# Patient Record
Sex: MALE | Race: BLACK OR AFRICAN AMERICAN | NOT HISPANIC OR LATINO | Employment: OTHER | URBAN - METROPOLITAN AREA
[De-identification: names, ages, dates, MRNs, and addresses within clinical notes are randomized per-mention and may not be internally consistent; named-entity substitution may affect disease eponyms.]

---

## 2020-06-01 PROBLEM — D50.9 IRON DEFICIENCY ANEMIA: Status: ACTIVE | Noted: 2020-06-01

## 2020-06-01 PROBLEM — I10 ESSENTIAL HYPERTENSION: Status: ACTIVE | Noted: 2020-06-01

## 2020-06-01 PROBLEM — D63.1 ANEMIA DUE TO CHRONIC KIDNEY DISEASE, ON CHRONIC DIALYSIS: Status: ACTIVE | Noted: 2020-06-01

## 2020-06-01 PROBLEM — R29.898 WEAKNESS OF EXTREMITY: Status: ACTIVE | Noted: 2020-06-01

## 2020-06-01 PROBLEM — M10.9 GOUT: Status: ACTIVE | Noted: 2020-06-01

## 2020-06-01 PROBLEM — M54.2 NECK PAIN: Status: ACTIVE | Noted: 2020-06-01

## 2020-06-01 PROBLEM — Z99.2 ANEMIA DUE TO CHRONIC KIDNEY DISEASE, ON CHRONIC DIALYSIS: Status: ACTIVE | Noted: 2020-06-01

## 2020-06-01 PROBLEM — I25.810 CORONARY ARTERY DISEASE INVOLVING CORONARY BYPASS GRAFT WITHOUT ANGINA PECTORIS: Status: ACTIVE | Noted: 2020-06-01

## 2020-06-01 PROBLEM — N18.6 ANEMIA DUE TO CHRONIC KIDNEY DISEASE, ON CHRONIC DIALYSIS: Status: ACTIVE | Noted: 2020-06-01

## 2020-06-01 PROBLEM — Z72.0 TOBACCO USE: Status: ACTIVE | Noted: 2020-06-01

## 2020-06-01 PROBLEM — B18.2 HEPATITIS C CARRIER: Status: ACTIVE | Noted: 2020-06-01

## 2020-06-01 PROBLEM — N18.6 ESRD (END STAGE RENAL DISEASE): Status: ACTIVE | Noted: 2018-11-30

## 2020-06-02 PROBLEM — M48.02 CERVICAL STENOSIS OF SPINAL CANAL: Status: ACTIVE | Noted: 2020-06-02

## 2020-06-21 ENCOUNTER — NURSE TRIAGE (OUTPATIENT)
Dept: ADMINISTRATIVE | Facility: CLINIC | Age: 65
End: 2020-06-21

## 2020-06-21 NOTE — TELEPHONE ENCOUNTER
Per symptom tracker protocol, no contact made. No follow up needed. Post Procedural Symptom Tracker. Pt had in office visit on which was days post-op from their procedure.  Pt recntly in the hospital for another procedure, No F/U needed    Reason for Disposition   Caller has already spoken with the PCP and has no further questions.    Protocols used: NO CONTACT OR DUPLICATE CONTACT CALL-A-

## 2020-06-25 PROBLEM — Z98.890 STATUS POST LAMINECTOMY: Status: ACTIVE | Noted: 2020-06-25

## 2020-07-01 ENCOUNTER — NURSE TRIAGE (OUTPATIENT)
Dept: ADMINISTRATIVE | Facility: CLINIC | Age: 65
End: 2020-07-01

## 2020-07-29 PROBLEM — Z98.1 STATUS POST CERVICAL SPINAL FUSION: Status: ACTIVE | Noted: 2020-07-29

## 2021-01-02 PROBLEM — T68.XXXA HYPOTHERMIA: Status: ACTIVE | Noted: 2021-01-02

## 2021-01-02 PROBLEM — Z99.2 ESRD (END STAGE RENAL DISEASE) ON DIALYSIS: Status: ACTIVE | Noted: 2018-11-30

## 2021-01-02 PROBLEM — A41.9 SEPSIS: Status: ACTIVE | Noted: 2021-01-02

## 2021-01-02 PROBLEM — I95.9 HYPOTENSION: Status: ACTIVE | Noted: 2021-01-02

## 2021-01-02 PROBLEM — D50.0 ANEMIA, BLOOD LOSS: Status: ACTIVE | Noted: 2021-01-02

## 2021-01-02 PROBLEM — D62 ACUTE BLOOD LOSS ANEMIA: Status: ACTIVE | Noted: 2021-01-02

## 2021-01-03 PROBLEM — I71.40 AAA (ABDOMINAL AORTIC ANEURYSM): Status: ACTIVE | Noted: 2021-01-03

## 2021-01-04 PROBLEM — K92.1 HEMATOCHEZIA: Status: ACTIVE | Noted: 2021-01-04

## 2021-01-04 PROBLEM — E87.20 METABOLIC ACIDOSIS: Status: ACTIVE | Noted: 2021-01-04

## 2021-01-04 PROBLEM — I46.9 CARDIAC ARREST: Status: ACTIVE | Noted: 2021-01-04

## 2021-01-06 PROBLEM — I95.9 HYPOTENSION: Status: RESOLVED | Noted: 2021-01-02 | Resolved: 2021-01-06

## 2021-01-06 PROBLEM — T68.XXXA HYPOTHERMIA: Status: RESOLVED | Noted: 2021-01-02 | Resolved: 2021-01-06

## 2021-01-07 PROBLEM — E83.39 HYPERPHOSPHATEMIA: Status: ACTIVE | Noted: 2021-01-07

## 2021-01-08 PROBLEM — R74.8 ELEVATED LIVER ENZYMES: Status: ACTIVE | Noted: 2021-01-08

## 2021-01-11 PROBLEM — G93.40 ENCEPHALOPATHY: Status: ACTIVE | Noted: 2021-01-11

## 2021-01-12 PROBLEM — A41.9 SEPSIS: Status: RESOLVED | Noted: 2021-01-02 | Resolved: 2021-01-12

## 2021-01-12 PROBLEM — E83.39 HYPOPHOSPHATEMIA: Status: ACTIVE | Noted: 2021-01-12

## 2021-01-13 PROBLEM — E87.6 HYPOKALEMIA: Status: ACTIVE | Noted: 2021-01-13

## 2021-01-15 PROBLEM — A41.9 SEPSIS: Status: ACTIVE | Noted: 2021-01-15

## 2021-01-15 PROBLEM — E83.52 HYPERCALCEMIA: Status: ACTIVE | Noted: 2021-01-15

## 2021-01-15 PROBLEM — I46.9 CARDIAC ARREST: Status: RESOLVED | Noted: 2021-01-04 | Resolved: 2021-01-15

## 2021-06-13 PROBLEM — T82.590A MALFUNCTION OF ARTERIOVENOUS DIALYSIS FISTULA: Status: ACTIVE | Noted: 2021-06-13

## 2021-06-13 PROBLEM — K92.1 MELENA: Status: ACTIVE | Noted: 2021-06-13

## 2021-06-17 PROBLEM — K59.09 OTHER CONSTIPATION: Status: ACTIVE | Noted: 2021-06-17
